# Patient Record
Sex: FEMALE | Race: WHITE | Employment: FULL TIME
[De-identification: names, ages, dates, MRNs, and addresses within clinical notes are randomized per-mention and may not be internally consistent; named-entity substitution may affect disease eponyms.]

---

## 2017-11-30 PROBLEM — E66.01 OBESITY, MORBID (HCC): Status: ACTIVE | Noted: 2017-11-30

## 2019-10-09 PROBLEM — G56.02 LEFT CARPAL TUNNEL SYNDROME: Status: ACTIVE | Noted: 2019-10-09

## 2019-10-09 PROBLEM — M65.4 DE QUERVAIN'S TENOSYNOVITIS, LEFT: Status: ACTIVE | Noted: 2019-10-09

## 2019-12-17 ENCOUNTER — NURSE TRIAGE (OUTPATIENT)
Dept: OTHER | Facility: CLINIC | Age: 54
End: 2019-12-17

## 2019-12-17 NOTE — TELEPHONE ENCOUNTER
Reason for Disposition   SEVERE pain (e.g. excruciating)    Protocols used: LEG INJURY-ADULT-[place]      Patient's location of employment: [employer]  Location of injury: right hip , back of  Thigh and calf  Time of injury: 12-17-19 @ 1040  Last 4 of patient's SSN: [id number]  Location recommended for treatment: [facility]    Pt c/o right hip, back of thigh and calf pain. She slipped on water on the floor and thinks she has a muscle pull in the right leg. States pain is 7/10. Has taken ibuprofen and using ice to injured area. No bleeding or open areas noted. Cannot see back of leg to determine bruising.   Employee injury packet emailed to pt @ [email or web address]

## 2020-02-08 PROBLEM — E04.2 MULTIPLE THYROID NODULES: Status: ACTIVE | Noted: 2020-02-08

## 2020-02-08 PROBLEM — E53.8 LOW SERUM VITAMIN B12: Status: ACTIVE | Noted: 2020-02-08

## 2020-02-08 PROBLEM — E06.3 HASHIMOTO'S THYROIDITIS: Status: ACTIVE | Noted: 2020-02-08

## 2020-02-08 PROBLEM — K76.0 NAFLD (NONALCOHOLIC FATTY LIVER DISEASE): Status: ACTIVE | Noted: 2020-02-08
